# Patient Record
Sex: MALE | Race: WHITE
[De-identification: names, ages, dates, MRNs, and addresses within clinical notes are randomized per-mention and may not be internally consistent; named-entity substitution may affect disease eponyms.]

---

## 2017-01-24 NOTE — RAD
INDICATION:  Right shoulder pain.



TECHNIQUE: 3 views of the right shoulder were obtained.



FINDINGS:  The bones are in normal alignment. No fracture is seen.  Joint spaces appear

maintained.



IMPRESSION:  NO EVIDENCE OF FRACTURE.

## 2017-01-24 NOTE — UC
Shoulder Pain HPI





- HPI Summary


HPI Summary: 





"slept funny" on right shoulder a few days ago, woke with nagging ache and 

stiffness in shoulder. Then yesterday he picked up his 30lb dog and felt a 

sudden sharp pain in shoulder and a "pop" sensation. Worse pain and stiffness 

since then. Can't use arm to do much due to pain. Hurts to move in any direction

, particularly abduction and ext rotation. No prior shoulder injuries. No 

redness or swelling. 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 

shoulder problems.





- History of Current Complaint


Chief Complaint: UCUpperExtremity


Stated Complaint: R SHOULDER INJURY


Time Seen by Provider: 01/24/17 15:16


Hx Obtained From: Patient, Family/Caretaker - wife


Onset/Duration: Gradual Onset, Lasting Days - 3


Timing: Constant


Severity Initially: Mild


Severity Currently: Moderate


Character: Dull, Aching, Stiffness


Aggravating Factor(s): Movement, External Rotation, Abduction


Alleviating Factor(s): Rest, Ice


Associated Signs And Symptoms: Negative: Swelling, Redness, Bruising, Fever, 

Weakness, Numbness/Tingling


Related History: Dominant Hand Right





- Risk Factors


Non-Orthopedic Risk Factor: Negative


DVT Risk Factors: Negative


Septic Arthritis Risk Factor: Negative





- Allergies/Home Medications


Allergies/Adverse Reactions: 


 Allergies











Allergy/AdvReac Type Severity Reaction Status Date / Time


 


No Known Allergies Allergy   Verified 01/24/17 14:50











Home Medications: 


 Home Medications





Ibuprofen [Ibuprofen 200 MG] 600 mg PO PRN 01/24/17 [History]











PMH/Surg Hx/FS Hx/Imm Hx


Endocrine History Of: 


   Denies: Diabetes, Thyroid Disease, Hyperthyroidism, Hypothyroidism, 

Dyslipidemia


Cardiovascular History Of: 


   Denies: Cardiac Disorders, Hypertension, Pacemaker/ICD, Myocardial Infarction

, Congestive Heart Failure, Atrial Fibrillation, Deep Vein Thrombosis, Bleeding 

Disorders


Respiratory History Of: 


   Denies: COPD, Asthma, Bronchitis, Pneumonia, Pulmonary Embolism


GI/ History Of: 


   Denies: Gastroesophageal Reflux, Ulcer, Gastrointestinal Bleed, Gall Bladder 

Disease, Kidney Stones, Diverticulitis, Renal Disease, Urosepsis


Neurological History Of: 


   Denies: TIA, CVA, Dementia, Seizures, Migraine


Psychological History Of: 


   Denies: Anxiety, Depression, Bipolar Disorder, Schizophrenia, Post Traumatic 

Stress Disorder


Cancer History Of: 


   Denies: Lung Cancer, Colorectal Cancer, Breast Cancer, Prostate Cancer, 

Cervical Cancer


Other History Of: 


   Negative For: HIV, Hepatitis C, Anticoagulant Therapy





- Surgical History


Surgical History: None





- Family History


Known Family History: 


   Negative: Cardiac Disease, Hypertension, Diabetes, Renal Disease





- Social History


Occupation: Employed Full-time - computer/keyboard


Lives: With Family


Alcohol Use: Rare


Substance Use Type: None


Smoking Status (MU): Never Smoked Tobacco





Review of Systems


Constitutional: Negative


Skin: Negative


Eyes: Negative


ENT: Negative


Respiratory: Negative


Cardiovascular: Negative


Gastrointestinal: Negative


Genitourinary: Negative


Motor: Negative


Neurovascular: Negative


Musculoskeletal: Arthralgia - right shoulder, Decreased ROM, Myalgia


Neurological: Negative


Psychological: Negative


All Other Systems Reviewed And Are Negative: Yes





Physical Exam


Triage Information Reviewed: Yes


Appearance: Well-Appearing, No Pain Distress, Well-Nourished


Vital Signs: 


 Initial Vital Signs











Temp  98.4 F   01/24/17 14:43


 


Pulse  70   01/24/17 14:43


 


Resp  14   01/24/17 14:43


 


BP  132/78   01/24/17 14:43


 


Pulse Ox  98   01/24/17 14:43











Vital Signs Reviewed: Yes


Eye Exam: Normal


Neck exam: Normal


Respiratory Exam: Normal


Respiratory: Positive: Lungs clear, Normal breath sounds, No respiratory 

distress, No accessory muscle use


Cardiovascular Exam: Normal


Musculoskeletal Exam: Other - right shoulder with diffusely limited ROM. Hurts 

to abduct beyond 90 degrees. Hurts to reach behind back, hurts to move arm 

across chest anteriorly. No surface tenderness. No pain over AC joint or biceps 

tendon insertion. No scapular pain. "Pain is just inside when I move it"


Neurological Exam: Normal


Psychological Exam: Normal


Skin Exam: Normal





Diagnostics





- Laboratory


Diagnostic Studies Completed/Ordered: shoulder xray neg





Shoulder Course/Dx





- Differential Dx/Diagnosis


Differential Diagnosis/HQI/PQRI: AC Separation, Arthritis, Bursitis, Sprain


Provider Diagnoses: shoulder strain





Discharge





- Discharge Plan


Condition: Stable


Disposition: HOME


Prescriptions: 


Meloxicam [Mobic] 15 mg PO DAILY PRN #20 tab


 PRN Reason: shoulder pain


Patient Education Materials:  Rotator Cuff Injury (ED)


Referrals: 


Scooby Osullivan MD [Medical Doctor] - 


Additional Instructions: 


If not improving after a week, talk to Dr. Osullivan (Orthopedic Surgery)

## 2018-12-28 ENCOUNTER — HOSPITAL ENCOUNTER (EMERGENCY)
Dept: HOSPITAL 25 - UCCORT | Age: 33
Discharge: HOME | End: 2018-12-28
Payer: COMMERCIAL

## 2018-12-28 VITALS — SYSTOLIC BLOOD PRESSURE: 110 MMHG | DIASTOLIC BLOOD PRESSURE: 62 MMHG

## 2018-12-28 DIAGNOSIS — R51: Primary | ICD-10-CM

## 2018-12-28 PROCEDURE — 99212 OFFICE O/P EST SF 10 MIN: CPT

## 2018-12-28 PROCEDURE — G0463 HOSPITAL OUTPT CLINIC VISIT: HCPCS

## 2018-12-28 NOTE — UC
General HPI





- HPI Summary


HPI Summary: 





pt presents to the  for evaluation of his right ear/face pain.  he has seen 

his pcp for this.  he was instructed to take tylenol and motrin for this.  he 

denies any swelling, difficulty chewing, swallowing.  he has seen a dentist 

recently and was told that everything was fine.  he is here with his wife.  she 

does not notice any asymmetry to his face.  he feels like his bite is normal. 

he denies any n/v/fever or chills. 





- History of Current Complaint


Chief Complaint: UCEar


Stated Complaint: RIGHT EAR CONCERN


Time Seen by Provider: 12/28/18 19:16


Hx Obtained From: Patient, Family/Caretaker


Onset Severity: Mild


Pain Intensity: 3





- Allergy/Home Medications


Allergies/Adverse Reactions: 


 Allergies











Allergy/AdvReac Type Severity Reaction Status Date / Time


 


No Known Allergies Allergy   Verified 12/28/18 17:42











Home Medications: 


 Home Medications





Naproxen Sodium [Aleve] 220 mg PO ONCE PRN 12/28/18 [History Confirmed 12/28/18]











PMH/Surg Hx/FS Hx/Imm Hx


Previously Healthy: Yes


Other History Of: 


   Negative For: HIV, Hepatitis C, Anticoagulant Therapy





- Surgical History


Surgical History: None





- Family History


Known Family History: 


   Negative: Cardiac Disease, Hypertension, Diabetes, Renal Disease





- Social History


Alcohol Use: Rare


Substance Use Type: None


Smoking Status (MU): Never Smoked Tobacco





Review of Systems


All Other Systems Reviewed And Are Negative: Yes


Constitutional: Positive: Negative


Skin: Positive: Negative


Eyes: Positive: Negative


ENT: Positive: Other - face pain on the right


Respiratory: Positive: Negative


Cardiovascular: Positive: Negative


Gastrointestinal: Positive: Negative


Genitourinary: Positive: Negative


Motor: Positive: Negative


Neurovascular: Positive: Negative


Musculoskeletal: Positive: Negative


Neurological: Positive: Headache - intermittently


Psychological: Positive: Negative


Is Patient Immunocompromised?: No





Physical Exam


Triage Information Reviewed: Yes


Appearance: Well-Appearing, No Pain Distress, Well-Nourished


Vital Signs: 


 Initial Vital Signs











Temp  97.5 F   12/28/18 17:40


 


Pulse  58   12/28/18 17:40


 


Resp  14   12/28/18 17:40


 


BP  110/62   12/28/18 17:40


 


Pulse Ox  99   12/28/18 17:40











Vital Signs Reviewed: Yes


Eye Exam: Normal


ENT Exam: Normal


ENT: Positive: Other - minimal tenderness anterior to right ear. no swelling, 

no redness, no induration.


Dental Exam: Normal


Neck exam: Normal


Respiratory Exam: Normal


Cardiovascular Exam: Normal


Abdomen Description: Positive: Nontender, Soft


Bowel Sounds: Positive: Present


Musculoskeletal Exam: Normal


Neurological Exam: Normal


Psychological Exam: Normal


Skin Exam: Normal





Course/Dx





- Course


Course Of Treatment: pt presented today with complaints of right jaw/ear pain 

for the past week.  he states that he saw his doctor and was given ibuprofen.  

he denies any fever, chills, or difficulty chewing.  he was recently seen by a 

dentist and no cavities.  on exam, no acute findings. I encouraged him to f/u 

with pcp.  if worse, he should go to the closest ed for further evaluation and 

possible CT of the face.  pt and wife voiced understanding.





- Diagnoses


Provider Diagnosis: 


 Right-sided face pain








Discharge





- Sign-Out/Discharge


Documenting (check all that apply): Patient Departure


All imaging exams completed and their final reports reviewed: No Studies





- Discharge Plan


Condition: Stable


Disposition: HOME


Prescriptions: 


Ibuprofen TAB* [Motrin TAB* 600 MG] 600 mg PO Q6H PRN #30 tab


 PRN Reason: Pain


Patient Education Materials:  Atypical Facial Pain (ED)


Referrals: 


Gerry LOO,Jerrod FRYE [Primary Care Provider] - 


Additional Instructions: 


follow up with your primary care physician.  return if worse or any new 

symptoms.  I have sent a prescription for motrin 600mg to take every 6 hours.  

you may also take 650mg of tylenol for pain.  





- Billing Disposition and Condition


Condition: STABLE


Disposition: Home